# Patient Record
Sex: MALE | Race: WHITE | NOT HISPANIC OR LATINO | Employment: FULL TIME | URBAN - METROPOLITAN AREA
[De-identification: names, ages, dates, MRNs, and addresses within clinical notes are randomized per-mention and may not be internally consistent; named-entity substitution may affect disease eponyms.]

---
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
## 2021-05-25 ENCOUNTER — OFFICE VISIT (OUTPATIENT)
Dept: FAMILY MEDICINE CLINIC | Facility: CLINIC | Age: 21
End: 2021-05-25

## 2021-05-25 VITALS
DIASTOLIC BLOOD PRESSURE: 70 MMHG | HEIGHT: 69 IN | WEIGHT: 165 LBS | BODY MASS INDEX: 24.44 KG/M2 | HEART RATE: 71 BPM | OXYGEN SATURATION: 97 % | SYSTOLIC BLOOD PRESSURE: 128 MMHG

## 2021-05-25 DIAGNOSIS — J30.1 SEASONAL ALLERGIC RHINITIS DUE TO POLLEN: Primary | ICD-10-CM

## 2021-05-25 PROCEDURE — 99214 OFFICE O/P EST MOD 30 MIN: CPT | Performed by: FAMILY MEDICINE

## 2021-05-25 RX ORDER — OLOPATADINE HYDROCHLORIDE 2 MG/ML
1 SOLUTION/ DROPS OPHTHALMIC DAILY
Qty: 2.5 ML | Refills: 5 | Status: SHIPPED | OUTPATIENT
Start: 2021-05-25

## 2021-05-25 RX ORDER — ECHINACEA PURPUREA EXTRACT 125 MG
1 TABLET ORAL DAILY
Qty: 30 ML | Refills: 12 | Status: SHIPPED | OUTPATIENT
Start: 2021-05-25

## 2021-05-25 RX ORDER — MOMETASONE FUROATE 50 UG/1
2 SPRAY, METERED NASAL DAILY
Qty: 17 G | Refills: 12 | Status: SHIPPED | OUTPATIENT
Start: 2021-05-25

## 2021-05-25 RX ORDER — METHYLPREDNISOLONE 4 MG/1
TABLET ORAL
Qty: 21 EACH | Refills: 0 | Status: SHIPPED | OUTPATIENT
Start: 2021-05-25

## 2021-05-25 NOTE — PATIENT INSTRUCTIONS
[phone]    Use nasal steroid in morning    Use nasal saline (ocrean spray) each nostril every evening    Use eye drops as needed    Take steroid for next week.

## 2021-05-25 NOTE — PROGRESS NOTES
Subjective   Anselmo Wallis is a 21 y.o. male.     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        Anselmo presents today for evaluation of ongoing nasal congestion, and allergies. The patient reports he had some spots evaluated by a dermatologist. He reports that for his allergies, he takes Allegra daily; this is not effective this year. The patient reports his eyes are swollen, bloodshot, watery, and scratchy. Additionally, he reports nasal congestion, difficulty breathing through his nose, and his throat is scratchy. He has tried Benadryl, however he only takes this at night due to it making him drowsy, as well as Sudafed. The patient adds that he tried Afrin on 05/24/2021, and he did not feel it was effective.     Additionally, the patient works in lawn care.       This patient is accompanied by their self who contributes to the history of their care.    The following portions of the patient's history were reviewed and updated as appropriate: allergies, current medications, past family history, past medical history, past social history, past surgical history and problem list.    Active Ambulatory Problems     Diagnosis Date Noted   • No Active Ambulatory Problems     Resolved Ambulatory Problems     Diagnosis Date Noted   • No Resolved Ambulatory Problems     Past Medical History:   Diagnosis Date   • Allergic      History reviewed. No pertinent surgical history.  Family History   Problem Relation Age of Onset   • Cancer Maternal Grandfather    •  "Hypertension Maternal Grandfather      Social History     Socioeconomic History   • Marital status: Single     Spouse name: Not on file   • Number of children: Not on file   • Years of education: Not on file   • Highest education level: Not on file   Tobacco Use   • Smoking status: Current Every Day Smoker     Types: Electronic Cigarette   • Smokeless tobacco: Never Used   Substance and Sexual Activity   • Alcohol use: No   • Drug use: No   • Sexual activity: Yes     Birth control/protection: None       Review of Systems  Review of Systems -  General ROS: negative for - chills, fever or night sweats  Cardiovascular ROS: no chest pain or dyspnea on exertion  Gastrointestinal ROS: no abdominal pain, change in bowel habits, or black or bloody stools  Genito-Urinary ROS: no dysuria, trouble voiding, or hematuria    Objective   Blood pressure 128/70, pulse 71, height 175.3 cm (69.02\"), weight 74.8 kg (165 lb), SpO2 97 %.  Nursing note reviewed  Physical Exam  Const: NAD, A&Ox4, Pleasant, Cooperative  Eyes: EOMI, no conjunctivitis  ENT: No nasal discharge present, neck supple  Cardiac: Regular rate and rhythm, no cyanosis  Resp: Respiratory rate within normal limits, no increased work of breathing, no audible wheezing or retractions noted  GI: No distention or ascites  MSK: Motor and sensation grossly intact in bilateral upper extremities  Neurologic: CN II-XII grossly intact  Psych: Appropriate mood and behavior.  Skin: Warm, dry  Procedures  Assessment/Plan   Problem List Items Addressed This Visit     None      Visit Diagnoses     Seasonal allergic rhinitis due to pollen    -  Primary    Relevant Medications    mometasone (Nasonex) 50 MCG/ACT nasal spray    olopatadine (PATADAY) 0.2 % solution ophthalmic solution    methylPREDNISolone (MEDROL) 4 MG dose pack    sodium chloride (Ocean Nasal Spray) 0.65 % nasal spray          1. Seasonal allergic rhinitis due to pollen.   -  I recommend that the patient use a nasal " steroid spray. I will prescribe Pataday as well. The patient will take Medrol as directed. The patient is instructed to use a nasal saline spray as well.     Patient Instructions   [phone]    Use nasal steroid in morning    Use nasal saline (ocrean spray) each nostril every evening    Use eye drops as needed    Take steroid for next week.      Return in about 2 weeks (around 6/8/2021) for video visit, follow-up allergies.    Ambulatory progress note signed and attested to by Refugio Sales D.O.       Scribed for Refugio Sales DO by Melania Byers.  05/25/21   15:32 EDT    I have personally performed the services described in this document as scribed by the above individual, and it is both accurate and complete.  Refugio Sales DO  5/25/2021  15:42 EDT

## 2021-06-08 ENCOUNTER — TELEMEDICINE (OUTPATIENT)
Dept: FAMILY MEDICINE CLINIC | Facility: CLINIC | Age: 21
End: 2021-06-08

## 2021-06-08 DIAGNOSIS — J30.1 SEASONAL ALLERGIC RHINITIS DUE TO POLLEN: Primary | ICD-10-CM

## 2021-06-08 PROCEDURE — 99213 OFFICE O/P EST LOW 20 MIN: CPT | Performed by: FAMILY MEDICINE

## 2021-06-08 NOTE — PROGRESS NOTES
Subjective   Anselmo Wallis is a 21 y.o. male.     Chief Complaint   Patient presents with   • Follow-up     allergies- doing much better       History of Present Illness     Anselmo Wallis presents today for   Chief Complaint   Patient presents with   • Follow-up     allergies- doing much better     He was seen 1 week ago for severe allergies. Steroids kicked in the last few days and he has been doing very well. Doin the nasal rise and nasal spray and doing the eye drops twice per day.    You have chosen to receive care through a telehealth visit.  Do you consent to use a video/audio connection for your medical care today? Yes    The following portions of the patient's history were reviewed and updated as appropriate: allergies, current medications, past family history, past medical history, past social history, past surgical history and problem list.    Active Ambulatory Problems     Diagnosis Date Noted   • No Active Ambulatory Problems     Resolved Ambulatory Problems     Diagnosis Date Noted   • No Resolved Ambulatory Problems     Past Medical History:   Diagnosis Date   • Allergic      No past surgical history on file.  Family History   Problem Relation Age of Onset   • Cancer Maternal Grandfather    • Hypertension Maternal Grandfather      Social History     Socioeconomic History   • Marital status: Single     Spouse name: Not on file   • Number of children: Not on file   • Years of education: Not on file   • Highest education level: Not on file   Tobacco Use   • Smoking status: Current Every Day Smoker     Types: Electronic Cigarette   • Smokeless tobacco: Never Used   Substance and Sexual Activity   • Alcohol use: No   • Drug use: No   • Sexual activity: Yes     Birth control/protection: None       Review of Systems  Review of Systems -  General ROS: negative for - chills, fever or night sweats  Cardiovascular ROS: no chest pain or dyspnea on exertion  Gastrointestinal ROS: he had some initial  stomach upset with the steroid but is better now  Genito-Urinary ROS: no dysuria, trouble voiding, or hematuria    Objective   There were no vitals taken for this visit.  Vitals obtained from patient if available  Physical Exam  Const: Non-toxic appearing, NAD, A&Ox4, Pleasant, Cooperative  Eyes: EOMI, no conjunctivitis  ENT: No copious nasal drainage noted  Cardiac: Regular rate by pulse  Resp: Respiratory rate observed to be within normal limits, no increased work of breathing observed, no audible wheezing or cough noted  Psych: Appropriate mood and behavior.  Procedures  Assessment/Plan   Problem List Items Addressed This Visit     None      Visit Diagnoses     Seasonal allergic rhinitis due to pollen    -  Primary          See patient diagnoses and orders along with patient instructions for assessment, plan, and changes to care for patient.    This visit was conducted via telemedicine with live video and audio provided through Video Options: [facility] at the point of care.    Patient Instructions   1.  Continue allergy medications including nasal steroid and saline nasal spray      Return in about 1 year (around 6/8/2022) for Annual.    Ambulatory progress note signed and attested to by Refugio Sales D.O.

## 2022-03-29 ENCOUNTER — TELEPHONE (OUTPATIENT)
Dept: FAMILY MEDICINE CLINIC | Facility: CLINIC | Age: 22
End: 2022-03-29

## 2022-04-13 NOTE — TELEPHONE ENCOUNTER
Called and spoke to patient. Informed patient needs a follow up in office. Voiced understanding. Stated he has a busy schedule but will call back to make an appointment.

## 2022-04-13 NOTE — TELEPHONE ENCOUNTER
Caller: Anselmo Wallis    Relationship: Self    Best call back number: [phone]    What was the call regarding: PATIENT STATES THAT HE WOULD LIKE A CALL ABOUT HIS MEDICATION REFILL.    Do you require a callback: YES